# Patient Record
Sex: MALE
[De-identification: names, ages, dates, MRNs, and addresses within clinical notes are randomized per-mention and may not be internally consistent; named-entity substitution may affect disease eponyms.]

---

## 2024-07-08 PROBLEM — Z00.00 ENCOUNTER FOR PREVENTIVE HEALTH EXAMINATION: Status: ACTIVE | Noted: 2024-07-08

## 2024-08-01 ENCOUNTER — APPOINTMENT (OUTPATIENT)
Dept: UROLOGY | Facility: CLINIC | Age: 64
End: 2024-08-01

## 2024-08-02 ENCOUNTER — APPOINTMENT (OUTPATIENT)
Dept: UROLOGY | Facility: CLINIC | Age: 64
End: 2024-08-02
Payer: COMMERCIAL

## 2024-08-02 DIAGNOSIS — Z12.5 ENCOUNTER FOR SCREENING FOR MALIGNANT NEOPLASM OF PROSTATE: ICD-10-CM

## 2024-08-02 PROCEDURE — 99203 OFFICE O/P NEW LOW 30 MIN: CPT

## 2024-08-05 NOTE — HISTORY OF PRESENT ILLNESS
[FreeTextEntry1] : the pt complaints left side swelling for last 10 years started after a varicocele sugary and overtime progressed to current state.

## 2024-08-05 NOTE — PHYSICAL EXAM
[TextEntry] : nad  ncat abd nd nt  abd nd nt back normal  left erin scrotal enlargement size simalr to eggplant

## 2024-08-06 LAB — PSA SERPL-MCNC: 1.48 NG/ML

## 2024-08-09 ENCOUNTER — APPOINTMENT (OUTPATIENT)
Dept: UROLOGY | Facility: CLINIC | Age: 64
End: 2024-08-09

## 2024-08-09 PROBLEM — N43.3 HYDROCELE OF TESTIS: Status: ACTIVE | Noted: 2024-08-02

## 2024-08-09 PROBLEM — E11.628 TYPE 2 DIABETES MELLITUS WITH OTHER SKIN COMPLICATION: Status: ACTIVE | Noted: 2024-08-09

## 2024-08-09 PROCEDURE — 99214 OFFICE O/P EST MOD 30 MIN: CPT

## 2024-08-09 NOTE — PHYSICAL EXAM
[General Appearance - Well Developed] : well developed [Normal Appearance] : normal appearance [] : no respiratory distress [Normal Station and Gait] : the gait and station were normal for the patient's age [Skin Color & Pigmentation] : normal skin color and pigmentation [No Focal Deficits] : no focal deficits [Oriented To Time, Place, And Person] : oriented to person, place, and time

## 2024-08-09 NOTE — PHYSICAL EXAM
CAD if he wants to order food but we will add Dr. Maryann Cook but the nail yet to [General Appearance - Well Developed] : well developed [Normal Appearance] : normal appearance [] : no respiratory distress [Normal Station and Gait] : the gait and station were normal for the patient's age [Skin Color & Pigmentation] : normal skin color and pigmentation [No Focal Deficits] : no focal deficits [Oriented To Time, Place, And Person] : oriented to person, place, and time

## 2024-08-12 NOTE — HISTORY OF PRESENT ILLNESS
[FreeTextEntry1] : the pt complaints left side swelling for last 10 years started after a varicocele sugary and overtime progressed to current state.  8/9/24 Patient presents to the office for follow up of testicular swelling. Patient states that hydrocele is bothersome to him, and he would like to have if surgically removed. PSA reviewed today.

## 2024-09-05 ENCOUNTER — OUTPATIENT (OUTPATIENT)
Dept: OUTPATIENT SERVICES | Facility: HOSPITAL | Age: 64
LOS: 1 days | End: 2024-09-05
Payer: COMMERCIAL

## 2024-09-05 VITALS
SYSTOLIC BLOOD PRESSURE: 145 MMHG | RESPIRATION RATE: 20 BRPM | DIASTOLIC BLOOD PRESSURE: 79 MMHG | WEIGHT: 177.03 LBS | HEART RATE: 75 BPM | HEIGHT: 69 IN | TEMPERATURE: 98 F | OXYGEN SATURATION: 97 %

## 2024-09-05 DIAGNOSIS — Z01.818 ENCOUNTER FOR OTHER PREPROCEDURAL EXAMINATION: ICD-10-CM

## 2024-09-05 DIAGNOSIS — Z98.890 OTHER SPECIFIED POSTPROCEDURAL STATES: Chronic | ICD-10-CM

## 2024-09-05 DIAGNOSIS — E11.9 TYPE 2 DIABETES MELLITUS WITHOUT COMPLICATIONS: ICD-10-CM

## 2024-09-05 DIAGNOSIS — N43.3 HYDROCELE, UNSPECIFIED: ICD-10-CM

## 2024-09-05 LAB
ANION GAP SERPL CALC-SCNC: 12 MMOL/L — SIGNIFICANT CHANGE UP (ref 5–17)
BUN SERPL-MCNC: 20 MG/DL — SIGNIFICANT CHANGE UP (ref 7–23)
CALCIUM SERPL-MCNC: 9.6 MG/DL — SIGNIFICANT CHANGE UP (ref 8.4–10.5)
CHLORIDE SERPL-SCNC: 103 MMOL/L — SIGNIFICANT CHANGE UP (ref 96–108)
CO2 SERPL-SCNC: 24 MMOL/L — SIGNIFICANT CHANGE UP (ref 22–31)
CREAT SERPL-MCNC: 1.21 MG/DL — SIGNIFICANT CHANGE UP (ref 0.5–1.3)
EGFR: 68 ML/MIN/1.73M2 — SIGNIFICANT CHANGE UP
GLUCOSE SERPL-MCNC: 241 MG/DL — HIGH (ref 70–99)
HCT VFR BLD CALC: 43.5 % — SIGNIFICANT CHANGE UP (ref 39–50)
HGB BLD-MCNC: 14 G/DL — SIGNIFICANT CHANGE UP (ref 13–17)
MCHC RBC-ENTMCNC: 28.8 PG — SIGNIFICANT CHANGE UP (ref 27–34)
MCHC RBC-ENTMCNC: 32.2 GM/DL — SIGNIFICANT CHANGE UP (ref 32–36)
MCV RBC AUTO: 89.5 FL — SIGNIFICANT CHANGE UP (ref 80–100)
NRBC # BLD: 0 /100 WBCS — SIGNIFICANT CHANGE UP (ref 0–0)
PLATELET # BLD AUTO: 176 K/UL — SIGNIFICANT CHANGE UP (ref 150–400)
POTASSIUM SERPL-MCNC: 4.3 MMOL/L — SIGNIFICANT CHANGE UP (ref 3.5–5.3)
POTASSIUM SERPL-SCNC: 4.3 MMOL/L — SIGNIFICANT CHANGE UP (ref 3.5–5.3)
RBC # BLD: 4.86 M/UL — SIGNIFICANT CHANGE UP (ref 4.2–5.8)
RBC # FLD: 12.7 % — SIGNIFICANT CHANGE UP (ref 10.3–14.5)
SODIUM SERPL-SCNC: 139 MMOL/L — SIGNIFICANT CHANGE UP (ref 135–145)
WBC # BLD: 5.44 K/UL — SIGNIFICANT CHANGE UP (ref 3.8–10.5)
WBC # FLD AUTO: 5.44 K/UL — SIGNIFICANT CHANGE UP (ref 3.8–10.5)

## 2024-09-05 PROCEDURE — 85027 COMPLETE CBC AUTOMATED: CPT

## 2024-09-05 PROCEDURE — 83036 HEMOGLOBIN GLYCOSYLATED A1C: CPT

## 2024-09-05 PROCEDURE — 80048 BASIC METABOLIC PNL TOTAL CA: CPT

## 2024-09-05 PROCEDURE — G0463: CPT

## 2024-09-05 RX ORDER — LIDOCAINE HCL 20 MG/ML
0.2 VIAL (ML) INJECTION ONCE
Refills: 0 | Status: DISCONTINUED | OUTPATIENT
Start: 2024-09-17 | End: 2024-10-01

## 2024-09-05 RX ORDER — SODIUM CHLORIDE 9 MG/ML
3 INJECTION INTRAMUSCULAR; INTRAVENOUS; SUBCUTANEOUS EVERY 8 HOURS
Refills: 0 | Status: DISCONTINUED | OUTPATIENT
Start: 2024-09-17 | End: 2024-10-01

## 2024-09-05 NOTE — H&P PST ADULT - PROBLEM SELECTOR PLAN 2
Last dose Jardiance 9/13/24  Last dose Lantus 9/16/24 HS, reduce to 16 units  HA1c done at [facility]  FS DOS

## 2024-09-05 NOTE — H&P PST ADULT - NSICDXPASTMEDICALHX_GEN_ALL_CORE_FT
PAST MEDICAL HISTORY:  Acute Renal Failure      PAST MEDICAL HISTORY:  Diabetes mellitus, type 2     HTN (hypertension)     Left hydrocele     Scrotal varices

## 2024-09-05 NOTE — H&P PST ADULT - NSANTHOSAYNRD_GEN_A_CORE
No. CARISSA screening performed.  STOP BANG Legend: 0-2 = LOW Risk; 3-4 = INTERMEDIATE Risk; 5-8 = HIGH Risk

## 2024-09-05 NOTE — H&P PST ADULT - PROBLEM SELECTOR PLAN 1
Plan for left hydrocelectomy on 9/17/24 with Dr. Reza.   PST labs sent   Pre surgical instructions discussed  Pre-op education provided - all questions answered

## 2024-09-05 NOTE — H&P PST ADULT - NSICDXPASTSURGICALHX_GEN_ALL_CORE_FT
PAST SURGICAL HISTORY:  Scrotal Varices      PAST SURGICAL HISTORY:  S/P excision of varicocele

## 2024-09-05 NOTE — H&P PST ADULT - ATTENDING COMMENTS
pt deneis changes in interval since clinic visit interested in left hydrocelectomy all questions answered.

## 2024-09-05 NOTE — H&P PST ADULT - HISTORY OF PRESENT ILLNESS
64 yo M with PMHx of... left sided testicular swelling for last 10 years that started after a varicocele sugary and progressively worsened over time.  Plan for left hydrocelectomy on 9/17/24 with Dr. Reza.  64 yo M with PMHx of HTN, T2DM, left sided testicular swelling for last 7 years that started after a varicocele surgery in 2001 and progressively worsened in size over time with discomfort.  Plan for left hydrocelectomy on 9/17/24 with Dr. Reza.

## 2024-09-05 NOTE — H&P PST ADULT - ASSESSMENT
DASI score:  DASI activity:  Loose teeth or dentures:  Airway:  DASI score: 8.2 METS  DASI activity: Physically active, works as , all ADLs, no limitations   Loose teeth or dentures: Denies   Airway: MP 2

## 2024-09-06 LAB
A1C WITH ESTIMATED AVERAGE GLUCOSE RESULT: 7.1 % — HIGH (ref 4–5.6)
ESTIMATED AVERAGE GLUCOSE: 157 MG/DL — HIGH (ref 68–114)

## 2024-09-12 PROBLEM — E11.9 TYPE 2 DIABETES MELLITUS WITHOUT COMPLICATIONS: Chronic | Status: ACTIVE | Noted: 2024-09-05

## 2024-09-12 PROBLEM — N43.3 HYDROCELE, UNSPECIFIED: Chronic | Status: ACTIVE | Noted: 2024-09-05

## 2024-09-12 PROBLEM — I10 ESSENTIAL (PRIMARY) HYPERTENSION: Chronic | Status: ACTIVE | Noted: 2024-09-05

## 2024-09-12 PROBLEM — I86.1 SCROTAL VARICES: Chronic | Status: ACTIVE | Noted: 2024-09-05

## 2024-09-16 ENCOUNTER — TRANSCRIPTION ENCOUNTER (OUTPATIENT)
Age: 64
End: 2024-09-16

## 2024-09-17 ENCOUNTER — TRANSCRIPTION ENCOUNTER (OUTPATIENT)
Age: 64
End: 2024-09-17

## 2024-09-17 ENCOUNTER — OUTPATIENT (OUTPATIENT)
Dept: OUTPATIENT SERVICES | Facility: HOSPITAL | Age: 64
LOS: 1 days | End: 2024-09-17
Payer: COMMERCIAL

## 2024-09-17 ENCOUNTER — APPOINTMENT (OUTPATIENT)
Dept: UROLOGY | Facility: HOSPITAL | Age: 64
End: 2024-09-17

## 2024-09-17 VITALS
HEART RATE: 91 BPM | OXYGEN SATURATION: 97 % | WEIGHT: 177.03 LBS | RESPIRATION RATE: 16 BRPM | SYSTOLIC BLOOD PRESSURE: 162 MMHG | DIASTOLIC BLOOD PRESSURE: 71 MMHG | HEIGHT: 69 IN | TEMPERATURE: 97 F

## 2024-09-17 VITALS
RESPIRATION RATE: 15 BRPM | SYSTOLIC BLOOD PRESSURE: 130 MMHG | HEART RATE: 68 BPM | DIASTOLIC BLOOD PRESSURE: 68 MMHG | OXYGEN SATURATION: 100 % | TEMPERATURE: 97 F

## 2024-09-17 DIAGNOSIS — N43.3 HYDROCELE, UNSPECIFIED: ICD-10-CM

## 2024-09-17 DIAGNOSIS — Z98.890 OTHER SPECIFIED POSTPROCEDURAL STATES: Chronic | ICD-10-CM

## 2024-09-17 LAB — GLUCOSE BLDC GLUCOMTR-MCNC: 144 MG/DL — HIGH (ref 70–99)

## 2024-09-17 PROCEDURE — 55040 REMOVAL OF HYDROCELE: CPT | Mod: LT

## 2024-09-17 PROCEDURE — 55060 REPAIR OF HYDROCELE: CPT | Mod: LT

## 2024-09-17 PROCEDURE — 55040 REMOVAL OF HYDROCELE: CPT

## 2024-09-17 PROCEDURE — 82962 GLUCOSE BLOOD TEST: CPT

## 2024-09-17 RX ORDER — FENTANYL CITRATE 50 UG/ML
25 INJECTION INTRAMUSCULAR; INTRAVENOUS
Refills: 0 | Status: DISCONTINUED | OUTPATIENT
Start: 2024-09-17 | End: 2024-09-17

## 2024-09-17 RX ORDER — TRAMADOL HYDROCHLORIDE 200 MG/1
1 TABLET, EXTENDED RELEASE ORAL
Qty: 18 | Refills: 0
Start: 2024-09-17 | End: 2024-09-19

## 2024-09-17 RX ORDER — OXYCODONE HYDROCHLORIDE 5 MG/1
5 TABLET ORAL ONCE
Refills: 0 | Status: DISCONTINUED | OUTPATIENT
Start: 2024-09-17 | End: 2024-09-17

## 2024-09-17 RX ORDER — LISINOPRIL 10 MG/1
1 TABLET ORAL
Refills: 0 | DISCHARGE

## 2024-09-17 RX ORDER — EMPAGLIFLOZIN 10 MG/1
1 TABLET, FILM COATED ORAL
Refills: 0 | DISCHARGE

## 2024-09-17 RX ORDER — INSULIN GLARGINE 100 [IU]/ML
20 INJECTION, SOLUTION SUBCUTANEOUS
Refills: 0 | DISCHARGE

## 2024-09-17 RX ORDER — ONDANSETRON 2 MG/ML
4 INJECTION, SOLUTION INTRAMUSCULAR; INTRAVENOUS ONCE
Refills: 0 | Status: DISCONTINUED | OUTPATIENT
Start: 2024-09-17 | End: 2024-10-01

## 2024-09-17 RX ADMIN — Medication 100 MILLILITER(S): at 06:58

## 2024-09-17 NOTE — ASU DISCHARGE PLAN (ADULT/PEDIATRIC) - CARE PROVIDER_API CALL
Braxton Reza  Urology  [address] [place] [address], [address] [address]  Phone: [phone]  Fax: [phone]  Follow Up Time:

## 2024-09-17 NOTE — ASU DISCHARGE PLAN (ADULT/PEDIATRIC) - ASU DC SPECIAL INSTRUCTIONSFT
BATHING: Please do not submerge wound underwater. You may shower and let water run over the incisions. Do not scrub with soap. Keep incisions clean, you may apply bacitracin (over the counter) for first 3 days after dressing removal, then you may use regular vaseline or other petroleum jelly.  PAIN: You may take Tylenol, up to 1000mg/q6hr, not to exceed 4000mg/day and alternate with Ibuprofen, up to 600mg/q6hr, not to exceed 2400mg/day. You may take either one every 6 hours, you may alternate these medications so that you take one every 3 hours (e.g., Tylenol at 12pm, Ibuprofen at 3pm, Tylenol at 6pm, Ibuprofen at 9pm, etc...). Follow the maximum doses and administration instructions on the bottles. If your pain is not controlled with over the counter pain medications, please call your urologist.  You may take Ultram for more severe pain every 4 hours.  ACTIVITY: Please wear the scrotal support or supportive underwear, this will help with some of the swelling and pain. No heavy lifting or straddle activities such as bicycle or sexual intercourse until your follow-up appointment. Otherwise, you may return to your usual level of physical activity.  DIET: Return to your usual diet, begin with liquid foods and progress slowly.  NOTIFY YOUR SURGEON IF: You have any bleeding that does not stop, any pus draining from your wound, any fever (over 100.4 F) or chills, persistent nausea/vomiting, persistent diarrhea, or if your pain is not controlled on your discharge pain medications.  FOLLOW-UP: Once you arrive home, please call the office to schedule a follow-up within 1-2 weeks.

## 2024-09-17 NOTE — ASU PATIENT PROFILE, ADULT - FALL HARM RISK - UNIVERSAL INTERVENTIONS
Bed in lowest position, wheels locked, appropriate side rails in place/Call bell, personal items and telephone in reach/Instruct patient to call for assistance before getting out of bed or chair/Non-slip footwear when patient is out of bed/[place] to call system/Physically safe environment - no spills, clutter or unnecessary equipment/Purposeful Proactive Rounding/Room/bathroom lighting operational, light cord in reach

## 2024-09-17 NOTE — ASU DISCHARGE PLAN (ADULT/PEDIATRIC) - NURSING INSTRUCTIONS
OK to take Tylenol/Acetaminophen at 2:30pm for pain and every 6 hours after as needed. OK to take Motrin/Ibuprofen as soon as needed for pain and every 6 hours after as needed.

## 2024-09-17 NOTE — ASU PATIENT PROFILE, ADULT - NSICDXPASTMEDICALHX_GEN_ALL_CORE_FT
PAST MEDICAL HISTORY:  Diabetes mellitus, type 2     HTN (hypertension)     Left hydrocele     Scrotal varices

## 2024-10-02 ENCOUNTER — APPOINTMENT (OUTPATIENT)
Dept: UROLOGY | Facility: CLINIC | Age: 64
End: 2024-10-02
Payer: COMMERCIAL

## 2024-10-02 VITALS — OXYGEN SATURATION: 97 % | HEART RATE: 63 BPM | RESPIRATION RATE: 14 BRPM | TEMPERATURE: 98 F

## 2024-10-02 DIAGNOSIS — N50.89 OTHER SPECIFIED DISORDERS OF THE MALE GENITAL ORGANS: ICD-10-CM

## 2024-10-02 DIAGNOSIS — N43.3 HYDROCELE, UNSPECIFIED: ICD-10-CM

## 2024-10-02 PROCEDURE — 99024 POSTOP FOLLOW-UP VISIT: CPT

## 2024-10-02 NOTE — PHYSICAL EXAM
[General Appearance - Well Developed] : well developed [Normal Appearance] : normal appearance [Bowel Sounds] : normal bowel sounds [Normal Station and Gait] : the gait and station were normal for the patient's age [Oriented To Time, Place, And Person] : oriented to person, place, and time [Chaperone Present] : A chaperone was present in the examining room during all aspects of the physical examination [de-identified] : scrotal edems sepcially on left   les full than when he had hydrocele [FreeTextEntry2] : HALI tan

## 2024-10-14 NOTE — PACU DISCHARGE NOTE - COMMENTS
Nerve Block    Date/Time: 10/14/2024 12:43 PM    Performed by: Alta Winchester MD  Authorized by: Alta Winchester MD    Laterality:  Right  Patient Location:  Pre-op  Indication: post-op pain management at surgeon's request    Preanesthetic Checklist Patient Identified (2 criteria), Block Plan Confirmed, Resuscitation Equipment Available, Supplemental O2 (if needed), Allergies Confirmed, Block Site Marked (if applicable), Monitors Applied, Aseptic Technique, Coagulation Status, Necessary Block Equipment Present, Timeout Performed, IV Access Functioning, Consent Verified, Drugs/Solutions Labeled and Sedation Given (if needed)    Patient Position:  Supine  Prep:  Chlorhexidine gluconate (CHG)  Max Sterile Barrier Technique:  Hand washing and sterile gloves  Monitoring:  Blood pressure and EKG  Injection Technique:  Single-shot  Procedures: ultrasound guided    Injection Assessment:  Negative aspiration for heme  Patient Condition:  Tolerated well, no immediate complications  Start Time:10/14/2024 12:43 PM  Stop Time: 10/14/2024 12:48 PM       No anesthesia complications

## 2024-12-11 ENCOUNTER — APPOINTMENT (OUTPATIENT)
Dept: UROLOGY | Facility: CLINIC | Age: 64
End: 2024-12-11
Payer: COMMERCIAL

## 2024-12-11 VITALS
OXYGEN SATURATION: 97 % | DIASTOLIC BLOOD PRESSURE: 81 MMHG | RESPIRATION RATE: 14 BRPM | HEART RATE: 65 BPM | SYSTOLIC BLOOD PRESSURE: 134 MMHG

## 2024-12-11 DIAGNOSIS — N43.3 HYDROCELE, UNSPECIFIED: ICD-10-CM

## 2024-12-11 PROCEDURE — 99024 POSTOP FOLLOW-UP VISIT: CPT

## 2025-03-12 ENCOUNTER — APPOINTMENT (OUTPATIENT)
Dept: UROLOGY | Facility: CLINIC | Age: 65
End: 2025-03-12

## (undated) DEVICE — PREP CHLORAPREP HI-LITE ORANGE 26ML

## (undated) DEVICE — POSITIONER FOAM HEADREST (PINK)

## (undated) DEVICE — DRSG STERISTRIPS 0.5 X 4"

## (undated) DEVICE — VENODYNE/SCD SLEEVE CALF MEDIUM

## (undated) DEVICE — SUT POLYSORB 3-0 30" V-20 UNDYED

## (undated) DEVICE — DRSG TEGADERM 6"X8"

## (undated) DEVICE — PREP BETADINE KIT

## (undated) DEVICE — ELCTR HEX BLADE

## (undated) DEVICE — DRAIN PENROSE .25" X 18" LATEX

## (undated) DEVICE — DRAPE INSTRUMENT POUCH 6.75" X 11"

## (undated) DEVICE — DRSG TELFA 3 X 8

## (undated) DEVICE — ELCTR GROUNDING PAD ADULT COVIDIEN

## (undated) DEVICE — SOL IRR POUR NS 0.9% 500ML

## (undated) DEVICE — DRAPE TOWEL BLUE 17" X 24"

## (undated) DEVICE — GLV 7.5 PROTEXIS (BLUE)

## (undated) DEVICE — ELCTR BOVIE TIP BLADE INSULATED 2.75" EDGE

## (undated) DEVICE — DRAPE LAPAROTOMY W VELCRO CORD TABS

## (undated) DEVICE — ELCTR BOVIE PENCIL SMOKE EVACUATION

## (undated) DEVICE — SOL IRR POUR H2O 250ML

## (undated) DEVICE — GLV 7.5 PROTEXIS (WHITE)

## (undated) DEVICE — PACK MINOR

## (undated) DEVICE — MEDICATION LABELS W MARKER

## (undated) DEVICE — SUSPENSORY WITH LEG STRAPS XL

## (undated) DEVICE — SOL IRR BAG NS 0.9% 3000ML

## (undated) DEVICE — GOWN TRIMAX LG

## (undated) DEVICE — SYR LUER LOK 10CC

## (undated) DEVICE — WARMING BLANKET UPPER ADULT

## (undated) DEVICE — SUT POLYSORB 4-0 P-12 UNDYED

## (undated) DEVICE — SOL IRR BAG H2O 3000ML

## (undated) DEVICE — POSITIONER FOAM EGG CRATE ULNAR 2PCS (PINK)

## (undated) DEVICE — SPECIMEN CONTAINER 100ML

## (undated) DEVICE — MARKING PEN W RULER